# Patient Record
Sex: FEMALE | Race: WHITE | NOT HISPANIC OR LATINO | URBAN - NONMETROPOLITAN AREA
[De-identification: names, ages, dates, MRNs, and addresses within clinical notes are randomized per-mention and may not be internally consistent; named-entity substitution may affect disease eponyms.]

---

## 2019-02-15 ENCOUNTER — IMPORTED ENCOUNTER (OUTPATIENT)
Dept: URBAN - NONMETROPOLITAN AREA CLINIC 1 | Facility: CLINIC | Age: 49
End: 2019-02-15

## 2019-02-15 PROBLEM — H52.221: Noted: 2019-02-15

## 2019-02-15 PROBLEM — H52.4: Noted: 2019-02-15

## 2019-02-15 PROBLEM — H25.13: Noted: 2019-02-15

## 2019-02-15 PROBLEM — H52.13: Noted: 2019-02-15

## 2019-02-15 PROCEDURE — 92310 CONTACT LENS FITTING OU: CPT

## 2019-02-15 PROCEDURE — V2520 CONTACT LENS HYDROPHILIC: HCPCS

## 2019-02-15 PROCEDURE — S0620 ROUTINE OPHTHALMOLOGICAL EXA: HCPCS

## 2019-02-15 NOTE — PATIENT DISCUSSION
Compound Myopic Astigmatism OD/Simple Myopia OS w/Presbyopia-  discussed findings w/patient-  new spectacle/CL Rx issued-  samples of Optifree issued today-  monitor yearly or prn Trace Nuclear Sclerosis OU-  discussed findings w/patient-  no treatment indicated at this time-  UV protection recommended-  monitor yearly or prn; 's Notes: MR 2/15/219DFE 2/15/2019

## 2021-11-18 ENCOUNTER — IMPORTED ENCOUNTER (OUTPATIENT)
Dept: URBAN - NONMETROPOLITAN AREA CLINIC 1 | Facility: CLINIC | Age: 51
End: 2021-11-18

## 2021-11-18 PROCEDURE — 92015 DETERMINE REFRACTIVE STATE: CPT

## 2021-11-18 PROCEDURE — 92014 COMPRE OPH EXAM EST PT 1/>: CPT

## 2021-11-18 PROCEDURE — 92340 FIT SPECTACLES MONOFOCAL: CPT

## 2021-11-18 NOTE — PATIENT DISCUSSION
Nuclear Sclerosis OU-  discussed findings w/patient-  mild changes noted at this time-  no treatment indicated-  UV protection recommended-  monitor yearly or prnCompound Myopic Astigmatism OD/Simple Myopia OS w/Presbyopia-  discussed findings w/patient-  new spectacle/CL Rx issued-  samples of Optifree issued today-  monitor yearly or prn; 's Notes:  11/18/2021D 11/18/2021

## 2022-04-09 ASSESSMENT — TONOMETRY
OS_IOP_MMHG: 15
OD_IOP_MMHG: 15
OS_IOP_MMHG: 12
OD_IOP_MMHG: 14

## 2022-04-09 ASSESSMENT — VISUAL ACUITY
OS_SC: 20/25
OS_SC: 20/20-2
OD_SC: 20/60
OS_SC: 20/25-2
OD_CC: 20/200
OS_CC: 20/200
OD_SC: 20/30